# Patient Record
Sex: MALE | Race: BLACK OR AFRICAN AMERICAN | NOT HISPANIC OR LATINO | Employment: FULL TIME | URBAN - METROPOLITAN AREA
[De-identification: names, ages, dates, MRNs, and addresses within clinical notes are randomized per-mention and may not be internally consistent; named-entity substitution may affect disease eponyms.]

---

## 2022-01-26 ENCOUNTER — TELEPHONE (OUTPATIENT)
Dept: GASTROENTEROLOGY | Facility: CLINIC | Age: 52
End: 2022-01-26
Payer: OTHER GOVERNMENT

## 2022-01-26 NOTE — TELEPHONE ENCOUNTER
----- Message from Dejah Hagen sent at 1/26/2022 12:06 PM CST -----  Regarding: [facility]: [facility]  Ok I [facility] my apologies   ----- Message -----  From: Nicole Mai LPN  Sent: 1/26/2022  11:59 AM CST  To: Dejah Hagen  Subject: FW: [facility]                         Pt's insurance is not chart. We are not able to schedule without this. Please update.    Thank you  ----- Message -----  From: Dejah Hagen  Sent: 1/26/2022  11:35 AM CST  To: Stef[facility]HELPS Staff  Subject: [facility]                               I have scanned the referral and records in to media mgr. Please contact pt to schedule and let me know if I can help any further.    Thank you,  Dejah Enriquez

## 2022-02-09 ENCOUNTER — OFFICE VISIT (OUTPATIENT)
Dept: GASTROENTEROLOGY | Facility: CLINIC | Age: 52
End: 2022-02-09
Payer: OTHER GOVERNMENT

## 2022-02-09 VITALS — HEIGHT: 69 IN | WEIGHT: 192 LBS | BODY MASS INDEX: 28.44 KG/M2

## 2022-02-09 DIAGNOSIS — Z12.11 SCREENING FOR COLON CANCER: Primary | ICD-10-CM

## 2022-02-09 DIAGNOSIS — Z01.818 PRE-OP TESTING: ICD-10-CM

## 2022-02-09 PROCEDURE — 99202 OFFICE O/P NEW SF 15 MIN: CPT | Mod: S$PBB,,, | Performed by: NURSE PRACTITIONER

## 2022-02-09 PROCEDURE — 99999 PR PBB SHADOW E&M-EST. PATIENT-LVL III: ICD-10-PCS | Mod: PBBFAC,,, | Performed by: NURSE PRACTITIONER

## 2022-02-09 PROCEDURE — 99213 OFFICE O/P EST LOW 20 MIN: CPT | Mod: PBBFAC,PO | Performed by: NURSE PRACTITIONER

## 2022-02-09 PROCEDURE — 99202 PR OFFICE/OUTPT VISIT, NEW, LEVL II, 15-29 MIN: ICD-10-PCS | Mod: S$PBB,,, | Performed by: NURSE PRACTITIONER

## 2022-02-09 PROCEDURE — 99999 PR PBB SHADOW E&M-EST. PATIENT-LVL III: CPT | Mod: PBBFAC,,, | Performed by: NURSE PRACTITIONER

## 2022-02-09 RX ORDER — BACLOFEN 10 MG/1
TABLET ORAL
COMMUNITY
Start: 2022-02-02 | End: 2023-10-24

## 2022-02-09 RX ORDER — MELOXICAM 7.5 MG/1
7.5 TABLET ORAL
COMMUNITY
End: 2023-11-03

## 2022-02-09 RX ORDER — NAPROXEN 500 MG/1
500 TABLET ORAL 2 TIMES DAILY
Status: ON HOLD | COMMUNITY
End: 2023-12-01 | Stop reason: HOSPADM

## 2022-02-09 RX ORDER — FLUTICASONE PROPIONATE 50 UG/1
POWDER, METERED RESPIRATORY (INHALATION)
COMMUNITY
End: 2023-11-03

## 2022-02-09 NOTE — PROGRESS NOTES
Subjective:       Patient ID: Marshall Anderson is a 51 y.o. male, Body mass index is 28.36 kg/m².    Chief Complaint: Other      Patient is new to me. [facility] for malignant neoplasm of intestinal tract.     GI Problem  Primary symptoms do not include fever, weight loss, fatigue, abdominal pain, nausea, vomiting, diarrhea, melena, hematemesis, jaundice, hematochezia, dysuria or rash.   The illness does not include chills, anorexia, dysphagia, odynophagia, bloating or constipation. Associated symptoms comments: [facility] for malignant neoplasm of intestinal tract. No prior hx of c-scope. Denies family history of colon cancer. Feeling well, no complaints. Associated medical issues do not include inflammatory bowel disease, GERD, gallstones, liver disease, alcohol abuse, PUD, gastric bypass, bowel resection, irritable bowel syndrome, hemorrhoids or diverticulitis.     Review of Systems   Constitutional: Negative for appetite change, chills, fatigue, fever, unexpected weight change and weight loss.   HENT: Negative for trouble swallowing.    Respiratory: Negative for cough and shortness of breath.    Cardiovascular: Negative for chest pain.   Gastrointestinal: Negative for abdominal distention, abdominal pain, anal bleeding, anorexia, bloating, blood in stool, constipation, diarrhea, dysphagia, hematemesis, hematochezia, jaundice, melena, nausea, rectal pain and vomiting.   Genitourinary: Negative for difficulty urinating and dysuria.   Musculoskeletal: Negative for gait problem.   Skin: Negative for rash.   Neurological: Negative for speech difficulty.   Psychiatric/Behavioral: Negative for confusion.       No past medical history on file.   No past surgical history on file.   Family History   Problem Relation Age of Onset    Colon cancer Neg Hx       Wt Readings from Last 10 Encounters:   02/09/22 87.1 kg (192 lb 0.3 oz)              Reviewed  prior medical records including endoscopy history (see surgical history).     Objective:      Physical Exam  Constitutional:       General: He is not in acute distress.Vital signs are normal.      Appearance: He is well-developed and well-nourished. He is not sickly-appearing.   HENT:      Head: Normocephalic.      Right Ear: Hearing normal.      Left Ear: Hearing normal.      Nose: Nose normal.      Mouth/Throat:      Mouth: Oropharynx is clear and moist and mucous membranes are normal.      Comments: Pt wearing mask due to COVID concerns  Eyes:      General: Lids are normal.      Conjunctiva/sclera: Conjunctivae normal.      Pupils: Pupils are equal, round, and reactive to light.   Neck:      Trachea: Trachea normal.   Cardiovascular:      Rate and Rhythm: Normal rate and regular rhythm.      Heart sounds: Normal heart sounds. No murmur heard.      Pulmonary:      Effort: Pulmonary effort is normal. No respiratory distress.      Breath sounds: Normal breath sounds. No stridor. No wheezing.   Abdominal:      General: Bowel sounds are normal. There is no distension or ascites.      Palpations: Abdomen is soft. There is no mass.      Tenderness: There is no abdominal tenderness. There is no guarding or rebound.   Musculoskeletal:         General: Normal range of motion.      Cervical back: Normal range of motion.   Skin:     General: Skin is warm, dry and intact.      Findings: No rash.      Comments: Non jaundiced   Neurological:      Mental Status: He is alert and oriented to person, place, and time.   Psychiatric:         Mood and Affect: Mood and affect normal.         Speech: Speech normal.         Behavior: Behavior normal. Behavior is cooperative.           Assessment:       1. Screening for colon cancer           Plan:   All diagnoses and orders for this visit:    Screening for colon cancer   - Schedule Colonoscopy    If no improvement in symptoms or symptoms worsen, call/follow-up at clinic or go to ER

## 2022-02-09 NOTE — PATIENT INSTRUCTIONS
"Patient Education       Colon and Rectal Cancer Screening   The Basics   Written by the doctors and editors at [facility]   What is colon and rectal cancer screening? -- Colon and rectal cancer screening is a way in which doctors check the colon and rectum for signs of cancer or growths (called polyps) that might become cancer. It is done in people who have no symptoms and no reason to think they have cancer. The goal is to find and remove polyps before they become cancer, or to find cancer early, before it grows, spreads, or causes problems.  The colon and rectum are the last part of the digestive tract (figure 1). When doctors talk about colon and rectal cancer screening, they use the term "colorectal." That is just a shorter way of saying "colon and rectal." It's also possible to say just colon cancer screening.  Studies show that having colon cancer screening lowers the chance of dying from colon cancer. There are several different types of screening test that can do this.  What are the different screening tests for colon cancer? -- They include:  · Colonoscopy - Colonoscopy allows the doctor to see directly inside the entire colon. Before you can have a colonoscopy, you must clean out your colon. You do this at home by drinking a special liquid that causes watery diarrhea for several hours. On the day of the test, you get medicine to help you relax, if you want. Then a doctor puts a thin tube into your anus and advances it into your colon (figure 2). The tube has a tiny camera attached to it, so the doctor can see inside your colon. The tube also has tiny tools on the end, so the doctor can remove pieces of tissue or polyps if they are there. After polyps or pieces of tissue are removed, they are sent to a lab to be checked for cancer.  ? Advantages of this test - Colonoscopy finds most small polyps and almost all large polyps and cancers. If found, polyps can be removed right away. This test gives the most " "accurate results. If any other screening tests are done first and come back positive (abnormal), a colonoscopy will need to be done for follow-up. If you have a colonoscopy as your first test, you will probably not need a second follow-up test soon after.  ? Drawbacks to this test - Colonoscopy has some risks. It can cause bleeding or tear the inside of the colon, but this only happens in 1 out of 1,000 people. Also, cleaning out the bowel beforehand can be unpleasant. Plus, people usually cannot work or drive for the rest of the day after the test, because of the relaxation medicine they take during the test.  In certain situations, a doctor might do something called a "capsule" colonoscopy. For this test, you swallow a special capsule that contains tiny wireless video cameras. This might be done if your doctor was not able to see all of your colon during a regular colonoscopy.   · CT colonography (also known as virtual colonoscopy or CTC) - CTC looks for cancer and polyps using a special X-ray called a "CT scan." For most CTC tests, the preparation is the same as it is for colonoscopy.  ? Advantages of this test - CTC can find polyps and cancers in the whole colon without the need for medicines to relax.  ? Drawbacks to this test - If doctors find polyps or cancer with CTC, they usually follow up with a colonoscopy. CTC sometimes finds areas that look abnormal but that turn out to be healthy. This means that CTC can lead to tests and procedures you did not need. Plus, CTC exposes you to radiation. In most cases, the preparation needed to clean the bowel is the same as the one needed for a colonoscopy. The test is expensive, and some insurance companies might not cover this test for screening.  · Stool test for blood - "Stool" is another word for bowel movements. Stool tests most commonly check for blood in samples of stool. Cancers and polyps can bleed, and if they bleed around the time you do the stool test, then " blood will show up on the test. The test can find even small amounts of blood that you can't see in your stool. Other less serious conditions can also cause small amounts of blood in the stool, and that will show up in this test. You will have to collect small samples from your bowel movements, which you will put in a special container you get from your doctor or nurse. Then you follow the instructions to mail the container out for the testing.  ? Advantages of this test - This test does not involve cleaning out the colon or having any procedures.  ? Drawbacks to this test - Stool tests are less likely to find polyps than other screening tests. These tests also often come up abnormal even in people who do not have cancer. If a stool test shows something abnormal, doctors usually follow up with a colonoscopy.  · Sigmoidoscopy - A sigmoidoscopy is similar in some ways to a colonoscopy. The difference is that this test looks only at the last part of the colon, and a colonoscopy looks at the whole colon. Before you have a sigmoidoscopy, you must clean out the lower part of your colon using an enema. This bowel cleaning is not as thorough or unpleasant as the one for colonoscopy. For this test, you do not need to take medicines to help you relax, so you can drive and work afterward if you want.  ? Advantages of this test - Sigmoidoscopy can find polyps and cancers in the rectum and the last part of the colon. If polyps are found, they can be removed right away.  ? Drawbacks to this test - In about 2 out of 10,000 people, sigmoidoscopy tears the inside of the colon. The test also can't find polyps or cancers that are in the part of the colon the test does not view (figure 3). If doctors find polyps or cancer during a sigmoidoscopy, they usually follow up with a colonoscopy.  · Stool DNA test - The stool DNA test checks for genetic markers of cancer, as well as for signs of blood. For this test, you get a special kit in  "order to collect a whole bowel movement. Then you follow the instructions about how and where to ship it.  ? Advantages of this test - This test does not involve cleaning out the colon or having any procedures. When cancer is not present, it is less likely to be falsely abnormal than a stool test for blood. That means it leads to fewer unnecessary colonoscopies.  ? Drawbacks to this test - It might be unpleasant to collect and ship a whole bowel movement. If a DNA test shows something abnormal, doctors usually follow up with a colonoscopy.  There is no blood test that most experts think is accurate enough to use for screening.  How do I choose which test to have? -- Work with your doctor or nurse to decide which test is best for you. Some doctors might choose to combine screening tests, for example, sigmoidoscopy plus stool testing for blood. Being screened-no matter how-is more important than which test you choose.  Who should be screened for colon cancer? -- Doctors recommend that most people begin having colon cancer screening at age 45. People who have an increased risk of getting colon cancer sometimes begin screening at a younger age. That might include people with a strong family history of colon cancer, and people with diseases of the colon called "Crohn's disease" and "ulcerative colitis."  Most people can stop being screened around the age of 75, or at the latest 85.  How often should I be screened? -- That depends on your risk of colon cancer and which test you have. People who have a high risk of colon cancer often need to be tested more often and should have a colonoscopy.  Most people are not at high risk, so they can choose one of these schedules:  · Colonoscopy every 10 years  · CT colonography (CTC) every 5 years  · Stool testing for blood once a year  · Sigmoidoscopy every 5 to 10 years  · Stool DNA testing every 3 years (but doctors are not yet sure of the best time frame for repeating the " "test)  All topics are updated as new evidence becomes available and our peer review process is complete.  This topic retrieved from [employer] on: Sep 21, 2021.  Topic 22445 Version 16.0  Release: 29.4.2 - C29.263  © 2021 UpToDate, Inc. and/or its affiliates. All rights reserved.  figure 1: Digestive system     This drawing shows the organs in the body that process food. Together these organs are called "the digestive system," or "digestive tract." As food travels through this system, the body absorbs nutrients and water.  Graphic 74592 Version 4.0    figure 2: Colonoscopy     During a colonoscopy, you lie on your side and the doctor puts a thin tube with a camera into your anus (from behind). Then the doctor advances the tube into the rectum and colon. The camera sends pictures from inside your colon to a television screen.  Graphic 11052 Version 6.0    figure 3: Colonoscopy versus sigmoidoscopy     During a colonoscopy or a sigmoidoscopy, you lie on your side, and the doctor or nurse puts a thin tube with a camera into your anus (from behind). Then the doctor or nurse advances the tube into the rectum and colon. The camera sends pictures from inside your colon to a television screen.  A colonoscopy allows the doctor to see the whole colon (shown in pink and green). A sigmoidoscopy allows the doctor to see only the last part of the colon (shown in green).  Graphic 18787 Version 5.0    Consumer Information Use and Disclaimer   This information is not specific medical advice and does not replace information you receive from your health care provider. This is only a brief summary of general information. It does NOT include all information about conditions, illnesses, injuries, tests, procedures, treatments, therapies, discharge instructions or life-style choices that may apply to you. You must talk with your health care provider for complete information about your health and treatment options. This information should " not be used to decide whether or not to accept your health care provider's advice, instructions or recommendations. Only your health care provider has the knowledge and training to provide advice that is right for you. The use of this information is governed by the [employer] End User License Agreement, available at https://www.[employer]/en/solutions/"[employer]"/about/davon.The use of [employer] content is governed by the [employer] Terms of Use. ©2021 [employer] All rights reserved.  Copyright   © 2021 UpToDate, Inc. and/or its affiliates. All rights reserved.

## 2023-10-12 ENCOUNTER — TELEPHONE (OUTPATIENT)
Dept: VASCULAR SURGERY | Facility: CLINIC | Age: 53
End: 2023-10-12
Payer: OTHER GOVERNMENT

## 2023-10-12 NOTE — TELEPHONE ENCOUNTER
----- Message from Melyssa Jaimes sent at 10/12/2023  3:16 PM CDT -----  Contact: pt  Type: Needs Medical Advice  Who Called:  pt  Best Call Back Number: [phone]    Additional Information: Pt is calling the office regarding referral that was sent in.please call back and advise.

## 2023-10-12 NOTE — TELEPHONE ENCOUNTER
----- Message from Sandy Roberson sent at 10/12/2023  3:17 PM CDT -----  Type:  Patient Returning Call    Who Called:  pt  Who Left Message for Patient:  unknown  Does the patient know what this is regarding?:  yes  Best Call Back Number:  [phone] (home)     Additional Information:  please call and advise--thank you

## 2023-10-12 NOTE — TELEPHONE ENCOUNTER
----- Message from Melyssa Jaimes sent at 10/12/2023  3:16 PM CDT -----  Contact: pt  Type: Needs Medical Advice  Who Called:  pt  Best Call Back Number: [phone]    Additional Information: Pt is calling the office regarding referral that was sent in.please call back and advise.

## 2023-10-24 ENCOUNTER — OFFICE VISIT (OUTPATIENT)
Dept: VASCULAR SURGERY | Facility: CLINIC | Age: 53
End: 2023-10-24
Payer: OTHER GOVERNMENT

## 2023-10-24 VITALS
HEIGHT: 69 IN | WEIGHT: 189.81 LBS | HEART RATE: 62 BPM | BODY MASS INDEX: 28.11 KG/M2 | SYSTOLIC BLOOD PRESSURE: 131 MMHG | DIASTOLIC BLOOD PRESSURE: 81 MMHG

## 2023-10-24 DIAGNOSIS — I25.10 CORONARY ARTERY DISEASE, UNSPECIFIED VESSEL OR LESION TYPE, UNSPECIFIED WHETHER ANGINA PRESENT, UNSPECIFIED WHETHER NATIVE OR TRANSPLANTED HEART: Primary | ICD-10-CM

## 2023-10-24 DIAGNOSIS — Z01.818 ENCOUNTER FOR OTHER PREPROCEDURAL EXAMINATION: ICD-10-CM

## 2023-10-24 DIAGNOSIS — I25.118 CORONARY ARTERY DISEASE INVOLVING NATIVE CORONARY ARTERY OF NATIVE HEART WITH OTHER FORM OF ANGINA PECTORIS: Primary | ICD-10-CM

## 2023-10-24 PROCEDURE — 99214 OFFICE O/P EST MOD 30 MIN: CPT | Mod: PBBFAC,PN | Performed by: THORACIC SURGERY (CARDIOTHORACIC VASCULAR SURGERY)

## 2023-10-24 PROCEDURE — 99999 PR PBB SHADOW E&M-EST. PATIENT-LVL IV: ICD-10-PCS | Mod: PBBFAC,,, | Performed by: THORACIC SURGERY (CARDIOTHORACIC VASCULAR SURGERY)

## 2023-10-24 PROCEDURE — 99205 PR OFFICE/OUTPT VISIT, NEW, LEVL V, 60-74 MIN: ICD-10-PCS | Mod: S$PBB,,, | Performed by: THORACIC SURGERY (CARDIOTHORACIC VASCULAR SURGERY)

## 2023-10-24 PROCEDURE — 99999 PR PBB SHADOW E&M-EST. PATIENT-LVL IV: CPT | Mod: PBBFAC,,, | Performed by: THORACIC SURGERY (CARDIOTHORACIC VASCULAR SURGERY)

## 2023-10-24 PROCEDURE — 99205 OFFICE O/P NEW HI 60 MIN: CPT | Mod: S$PBB,,, | Performed by: THORACIC SURGERY (CARDIOTHORACIC VASCULAR SURGERY)

## 2023-10-24 RX ORDER — LANOLIN ALCOHOL/MO/W.PET/CERES
2000 CREAM (GRAM) TOPICAL
COMMUNITY
Start: 2023-02-01

## 2023-10-24 RX ORDER — AZELASTINE 1 MG/ML
SPRAY, METERED NASAL
COMMUNITY
Start: 2023-10-18

## 2023-10-24 RX ORDER — ALLOPURINOL 100 MG/1
200 TABLET ORAL NIGHTLY
COMMUNITY
Start: 2023-08-21

## 2023-10-24 RX ORDER — ASPIRIN 81 MG/1
81 TABLET ORAL DAILY
COMMUNITY
Start: 2023-04-13

## 2023-10-24 RX ORDER — CETIRIZINE HYDROCHLORIDE 10 MG/1
10 TABLET ORAL DAILY PRN
COMMUNITY
Start: 2023-10-18

## 2023-10-24 RX ORDER — AMOXICILLIN AND CLAVULANATE POTASSIUM 875; 125 MG/1; MG/1
1 TABLET, FILM COATED ORAL 2 TIMES DAILY
COMMUNITY
Start: 2023-10-18 | End: 2023-11-21 | Stop reason: CLARIF

## 2023-10-24 RX ORDER — CHOLECALCIFEROL (VITAMIN D3) 50 MCG
1 TABLET ORAL DAILY
COMMUNITY
Start: 2023-02-01

## 2023-10-24 RX ORDER — PANTOPRAZOLE SODIUM 40 MG/1
40 TABLET, DELAYED RELEASE ORAL NIGHTLY
COMMUNITY
Start: 2023-05-20

## 2023-10-24 RX ORDER — LIDOCAINE 50 MG/G
PATCH TOPICAL
COMMUNITY
Start: 2023-02-01

## 2023-10-24 RX ORDER — ATORVASTATIN CALCIUM 80 MG/1
80 TABLET, FILM COATED ORAL NIGHTLY
COMMUNITY
Start: 2023-04-13

## 2023-10-24 NOTE — PROGRESS NOTES
"THIS PATIENT'S OPERATIVE DATE HAS BEEN CHANGED SEVERAL TIMES IN LIGHT OF THE THANKSGIVING HOLIDAY.  HE PRESENTS TODAY FOR CABG, EVH.    THERE HAVE BEEN NO CHANGES IN THE DOCUMENTED HISTORY AND PE.    HE HAS BEEN "CLEARED" BY PULMONARY FOR THE, LIKELY, PULMONARY SARCOIDOSIS.      DATE OF CONSULTATION: 10/24/2023     CONSULT REQUESTED BY:  Dr. Alayna Hutchinson    REASON FOR CONSULTATION:  Exertional dyspnea, triple-vessel coronary artery disease     HPI:   The patient is a 53-year-old  male with a family history of coronary artery disease.  His father  from coronary artery disease at the age of 68.    When questioned specifically today, the patient has difficulty explaining symptoms leading up to a stress test, Holter, ECHO, and left heart catheterization.    As best as I can tell, the patient presented to his primary care provider at the [facility] with a several month history of exertional dyspnea.  He also has a chronic left upper extremity tremor that he wanted evaluated.    The patient had a brain scan demonstrating a chronic stroke.  This led to a carotid ultrasound (nonobstructive disease) where swollen lymph nodes were identified.  This led to a CT scan of the chest which demonstrates multiple spots.  The spots were biopsied and the patient was initiated on antibiotic therapy.    I assume, because of his complaints of exertional dyspnea, he was seen by Dr. Hutchinson who ordered a Holter monitor (no significant arrhythmias for 6 days), an echocardiogram 2023 (ejection fraction 55%, left ventricular hypertrophy, moderate mitral regurgitation, mild aortic regurgitation/tricuspid regurgitation) and a stress test 2023 which is suggestive of reversible ischemia.    Left heart catheterization with coronary angiography demonstrates a 90% ostial right coronary artery lesion, occlusion of the distal circumflex with collateralization of a branching OM, and a long moderately severe " proximal LAD stenosis.    Cardiac surgical consultation has been requested for CABG.    The patient lives a busy and very active lifestyle and is in denial about his symptoms.  His wife, however, endorses slowly progressive exertional dyspnea and generalized fatigue over the last 6 months or so.  He, specifically, denies any anginal-type symptoms or symptoms of congestive heart failure other than the exertional dyspnea. He denies ever having had a heart attack.       Data Review:  In preparation for this consultation, I have reviewed the patient's entire medical record from Dr. Hutchinson's office.  I personally reviewed images from the coronary angiogram today.      No past medical history on file.     Hypertension, hyperlipidemia, gout, right foot injury/infection-resolved     Past Surgical History:   Procedure Laterality Date    COLONOSCOPY N/A 4/4/2022    Procedure: COLONOSCOPY;  Surgeon: Mike Ko MD;  Location: [facility];  Service: Endoscopy;  Laterality: N/A;    KNEE ARTHROSCOPY Right     ROTATOR CUFF REPAIR Left         Right knee arthroscopy, left rotator cuff repair, wisdom teeth extraction     Social History     Socioeconomic History    Marital status:    Tobacco Use    Smoking status: Former     Types: Cigarettes    Smokeless tobacco: Never   Substance and Sexual Activity    Alcohol use: Not Currently     Comment: quit years ago    Drug use: Never    Sexual activity: Yes        The patient has a 10-15 pack-year history of tobacco abuse.  He quit smoking cigarettes in 1992.  The patient has not consumed an alcoholic beverage since 2006.  He is  and has 5 adult children who are all in good health.  The patient works multiple jobs and lives a busy and active lifestyle but does not exercise regularly.  He is a , he mows yards, and works as a Constable.  He receives regular, twice yearly, dental care.       Allergies:  Morphine-itching      Prior to Admission Meds (Last known  outpatient meds at time of note signature)   Prior to Admission medications    Medication Sig Start Date End Date Taking? Authorizing Provider   aspirin (ECOTRIN) 81 MG EC tablet 81 mg. 4/13/23  Yes Provider, Historical   atorvastatin (LIPITOR) 80 MG tablet 40 mg. 4/13/23  Yes Provider, Historical   cetirizine (ZYRTEC) 10 MG tablet 10 mg. 10/18/23  Yes Provider, Historical   cholecalciferol, vitamin D3, (VITAMIN D3) 50 mcg (2,000 unit) Tab Take 1 tablet by mouth once daily. 2/1/23  Yes Provider, Historical   cyanocobalamin (VITAMIN B-12) 1000 MCG tablet 2,000 mcg. 2/1/23  Yes Provider, Historical   LIDOcaine (LIDODERM) 5 % APPLY 2 PATCHES TOPICALLY EVERY DAY WEAR FOR 12 HOURS, THEN REMOVE. DO NOT APPLY NEW PATCH FOR AT LEAST 12 HOURS 2/1/23  Yes Provider, Historical   allopurinoL (ZYLOPRIM) 100 MG tablet Take 200 mg by mouth. 8/21/23   Provider, Historical   amoxicillin-clavulanate 875-125mg (AUGMENTIN) 875-125 mg per tablet Take 1 tablet by mouth 2 (two) times daily. 10/18/23   Provider, Historical   azelastine (ASTELIN) 137 mcg (0.1 %) nasal spray SMARTSIG:Both Nares 10/18/23   Provider, Historical   fluticasone propionate (FLOVENT DISKUS) 50 mcg/actuation DsDv Inhale into the lungs. Controller    Provider, Historical   meloxicam (MOBIC) 7.5 MG tablet Take 7.5 mg by mouth.    Provider, Historical   naproxen (NAPROSYN) 500 MG tablet Take 500 mg by mouth 2 (two) times daily.    Provider, Historical   pantoprazole (PROTONIX) 40 MG tablet Take 40 mg by mouth. 5/20/23   Provider, Historical   baclofen (LIORESAL) 10 MG tablet  2/2/22 10/24/23  Provider, Historical   pantoprazole (PROTONIX) 20 MG tablet Take 2 tablets (40 mg total) by mouth once daily. 4/6/22 10/24/23  Mary Simons PA-C   sucralfate (CARAFATE) 100 mg/mL suspension Take 10 mLs (1 g total) by mouth every 6 (six) hours. 4/6/22 10/24/23  Mary Simons PA-C        Review of Systems:   Constitutional: no fever, no chills, no appetite  change, no unexpected weight change   Dermatological: no jaundice, no rash, no nodules, no ulcers, no pruritis   HEENT: no vision change, no hearing change, no nasal discharge, no sore throat   Neck: no unusual stiffness, no swollen glands, no goiter   Respiratory: (+) dyspnea, no cough, no hemoptysis, no wheezing,  Cardiovascular: no chest pain, no palpitations, no edema   Gastrointestinal: no abdominal discomfort   Musculoskeletal: no new joint pain, no joint swelling, no myalgia   : no dysuria, no frequency, no gross hematuria   HEME: no prolonged bleeding, no excessive bruising, no blood clots, no adenopathy   Endocrine: no excessive thirst, no unusual intolerance of heat or cold   Neurological: no confusion, no seizures, no syncope, no falls   Psychological: no anxiety, no depression     Objective:   Vitals:    10/24/23 1243   BP: 131/81   Pulse: 62       Physical Exam:   Constitutional: Alert, appears stated age, cooperative and no distress.  Normal body habitus, no obvious deformities, good attention to grooming.   Head: Normocephalic, without obvious abnormality, atraumatic   Eyes: Conjunctivae/corneas clear. PERRL, EOM's intact. No exudate.  Nose: Nares normal. Septum midline. Mucosa normal. No drainage or sinus tenderness.   Throat: Lips, mucosa, and tongue normal; teeth and gums normal   Neck: No adenopathy, no carotid bruit, no JVD, supple, symmetrical, trachea midline, and thyroid not enlarged, symmetric, no tenderness/mass/nodules.   Back: Symmetric, no curvature ROM normal No CVA tenderness.   Lungs: Clear to auscultation bilaterally and good air exchange. No tachypnea. No use of accessory muscles.   Heart: Regular rate and rhythm, S1, S2 normal, no murmur, click, rub or gallop. PMI nondisplaced.   Abdomen: Soft, non-tender, bowel sounds normal; No hepatosplenomegaly. No hernias   Aorta: no evidence of aneurysm   Musculoskeletal: No evidence of kyphosis or scoliosis. Normal gait. Normal muscle  strength and tone. No evidence of limb atrophy or abnormal movements.   Extremities: Normal, atraumatic, no clubbing, cyanosis, or edema. Hair present on pretibial regions. There are no venous varicosities.   Pulses: 2+ and symmetric in both radial and femoral regions.  Skin: Skin color, texture, turgor normal.  No rashes or lesions.  Lymph nodes: Cervical, supraclavicular, and axillary nodes normal   Neurologic/psychiatric: Cranial nerves 2-12 are grossly intact No obvious abnormality. Oriented to person, place, and time. No depression, anxiety or agitation.     Plan:  The patient has significant triple-vessel coronary artery disease.  He admits to slowly progressive exertional dyspnea.     The best treatment option for him will be surgical coronary revascularization.    I had a long (> 1 hour) discussion with the patient and his wife in the office this afternoon.  I reviewed the surgical indications and the sub optimal medical therapeutic alternative treatments with them.  Many questions were asked and answered.      I also discussed, in detail, the potential surgical risks and the expected benefits and outcome of coronary artery bypass grafting, endoscopic saphenous vein and left radial artery harvest.      I calculated and discussed the 30-day STS risk for morbidity or mortality with them.     Procedure Type: Isolated CABG  Perioperative Outcome Estimate %  Operative Mortality 0.67%  Morbidity & Mortality 7.13%  Stroke 1.69%  Renal Failure 0.664%  Reoperation 2.42%  Prolonged Ventilation 3.16%  Deep Sternal Wound Infection 0.119%  Long Hospital Stay (>14 days) 2.98%  Short Hospital Stay (<6 days)* 50.2%    The patient and his wife seem to understand and wish to proceed as advised.  The patient has chosen Monday, 11/06/2023, as his operative date.  He will undergo routine preoperative evaluation prior to this.    I have reviewed the patient's current medical regimen.  He is prescribed aspirin 81 mg, Lipitor 80  mg, and Toprol-XL 25 mg.    Thank you, Dr. Hutchinson, for allowing me to participate in the care of this patient.

## 2023-11-03 ENCOUNTER — TELEPHONE (OUTPATIENT)
Dept: VASCULAR SURGERY | Facility: CLINIC | Age: 53
End: 2023-11-03
Payer: OTHER GOVERNMENT

## 2023-11-03 PROBLEM — Z86.73 HISTORY OF STROKE: Status: ACTIVE | Noted: 2023-11-03

## 2023-11-03 PROBLEM — I25.10 CAD, MULTIPLE VESSEL: Status: ACTIVE | Noted: 2023-11-03

## 2023-11-03 PROBLEM — I44.0 FIRST DEGREE AV BLOCK: Status: ACTIVE | Noted: 2023-11-03

## 2023-11-03 PROBLEM — R06.09 DOE (DYSPNEA ON EXERTION): Status: ACTIVE | Noted: 2023-11-03

## 2023-11-03 PROBLEM — I51.89 GRADE I DIASTOLIC DYSFUNCTION: Status: ACTIVE | Noted: 2023-11-03

## 2023-11-03 PROBLEM — I45.10 INCOMPLETE RIGHT BUNDLE BRANCH BLOCK: Status: ACTIVE | Noted: 2023-11-03

## 2023-11-03 PROBLEM — R00.1 SINUS BRADYCARDIA: Status: ACTIVE | Noted: 2023-11-03

## 2023-11-03 PROBLEM — I10 HYPERTENSION: Status: ACTIVE | Noted: 2023-11-03

## 2023-11-03 NOTE — TELEPHONE ENCOUNTER
Verbal order given for EKG to Krystal   ----- Message from Orlando Swartz sent at 11/3/2023 10:50 AM CDT -----  Type: Needs Medical Advice  Who Called:   Krystal/ JORGE- Pre Admit      Best Call Back Number: [phone]  Additional Information: Caller states that she would like a Verbal for the patient's EKG  Please call ASAP-- Patient is waiting

## 2023-11-06 DIAGNOSIS — R91.8 PULMONARY NODULES: Primary | ICD-10-CM

## 2023-11-07 PROBLEM — R59.9 ENLARGED LYMPH NODES: Status: ACTIVE | Noted: 2023-11-07

## 2023-11-07 PROBLEM — D86.9 SARCOIDOSIS: Status: ACTIVE | Noted: 2023-11-07

## 2023-11-28 PROBLEM — Z95.1 HX OF CABG: Status: ACTIVE | Noted: 2023-11-28

## 2023-12-01 ENCOUNTER — TELEPHONE (OUTPATIENT)
Dept: VASCULAR SURGERY | Facility: CLINIC | Age: 53
End: 2023-12-01
Payer: OTHER GOVERNMENT

## 2023-12-01 NOTE — TELEPHONE ENCOUNTER
Appt scheduled for p/o appt and CXR prior.     ----- Message from Patricia Lobato sent at 12/1/2023  8:39 AM CST -----  Regarding: Hosp f/u  Type:  Sooner Apoointment Request    Caller is requesting a sooner appointment.  Caller declined first available appointment listed below.  Caller will not accept being placed on the waitlist and is requesting a message be sent to doctor.    Name of Caller:Nurse Paris PATEL    When is the first available appointment?n/a    Symptoms:Hosp f/u    Would the patient rather a call back or a response via [facility]? Call back    Best Call Back Number:[phone]    Additional Information: Pt will be discharge today and needs a f/u appt. Nurse would also like a call back [phone] about [facility]hab. Please advise -----thank you

## 2023-12-21 ENCOUNTER — HOSPITAL ENCOUNTER (OUTPATIENT)
Dept: RADIOLOGY | Facility: HOSPITAL | Age: 53
Discharge: HOME | End: 2023-12-21
Attending: THORACIC SURGERY (CARDIOTHORACIC VASCULAR SURGERY)
Payer: OTHER GOVERNMENT

## 2023-12-21 ENCOUNTER — OFFICE VISIT (OUTPATIENT)
Dept: VASCULAR SURGERY | Facility: CLINIC | Age: 53
End: 2023-12-21
Payer: OTHER GOVERNMENT

## 2023-12-21 VITALS
SYSTOLIC BLOOD PRESSURE: 110 MMHG | BODY MASS INDEX: 27.47 KG/M2 | WEIGHT: 185.5 LBS | DIASTOLIC BLOOD PRESSURE: 74 MMHG | HEART RATE: 70 BPM | HEIGHT: 69 IN

## 2023-12-21 DIAGNOSIS — I25.118 CORONARY ARTERY DISEASE INVOLVING NATIVE CORONARY ARTERY OF NATIVE HEART WITH OTHER FORM OF ANGINA PECTORIS: ICD-10-CM

## 2023-12-21 DIAGNOSIS — Z95.1 HX OF CABG: Primary | ICD-10-CM

## 2023-12-21 DIAGNOSIS — D86.9 SARCOIDOSIS: ICD-10-CM

## 2023-12-21 PROCEDURE — 99999 PR PBB SHADOW E&M-EST. PATIENT-LVL III: CPT | Mod: PBBFAC,,, | Performed by: THORACIC SURGERY (CARDIOTHORACIC VASCULAR SURGERY)

## 2023-12-21 PROCEDURE — 71046 XR CHEST PA AND LATERAL: ICD-10-PCS | Mod: 26,,, | Performed by: RADIOLOGY

## 2023-12-21 PROCEDURE — 99999 PR PBB SHADOW E&M-EST. PATIENT-LVL III: ICD-10-PCS | Mod: PBBFAC,,, | Performed by: THORACIC SURGERY (CARDIOTHORACIC VASCULAR SURGERY)

## 2023-12-21 PROCEDURE — 71046 X-RAY EXAM CHEST 2 VIEWS: CPT | Mod: 26,,, | Performed by: RADIOLOGY

## 2023-12-21 PROCEDURE — 99024 PR POST-OP FOLLOW-UP VISIT: ICD-10-PCS | Mod: ,,, | Performed by: THORACIC SURGERY (CARDIOTHORACIC VASCULAR SURGERY)

## 2023-12-21 PROCEDURE — 71046 X-RAY EXAM CHEST 2 VIEWS: CPT | Mod: TC,FY,PO

## 2023-12-21 PROCEDURE — 99024 POSTOP FOLLOW-UP VISIT: CPT | Mod: ,,, | Performed by: THORACIC SURGERY (CARDIOTHORACIC VASCULAR SURGERY)

## 2023-12-21 PROCEDURE — 99213 OFFICE O/P EST LOW 20 MIN: CPT | Mod: PBBFAC,25,PN | Performed by: THORACIC SURGERY (CARDIOTHORACIC VASCULAR SURGERY)

## 2023-12-21 NOTE — PROGRESS NOTES
12/21/2023...    The patient underwent CABG x3 with a LIMA 11/28/2023.  His postoperative course was smooth and progressive.  He remained in normal sinus rhythm for the duration.  He was discharged home on the 3rd postoperative day.    The patient visits the office today accompanied by his wife.  He looks terrific!  He has no cardiopulmonary complaints.  He notices improvement in his exertional dyspnea.  He has newly diagnosed pulmonary sarcoidosis with plans for treatment initiation in February 2024.    On physical exam, breath sounds are clear and equal bilaterally.  His sternotomy wound has healed nicely.  The bone is solid.  The chest tube sites are healed.  He has no lower extremity edema and the EVH site is healed.    CXR today is clear-there is no pleural effusion.  The radiologist has noted pulmonary nodules and hilar adenopathy consistent with the diagnosis of sarcoidosis.    I have reviewed the patient's medications with him today.  He has been compliant.    I will see him for a final checkup in 6-8 weeks   Today, I have encouraged him to liberalize his activity level

## 2024-02-06 ENCOUNTER — TELEPHONE (OUTPATIENT)
Dept: VASCULAR SURGERY | Facility: CLINIC | Age: 54
End: 2024-02-06
Payer: OTHER GOVERNMENT

## 2024-02-07 NOTE — PROGRESS NOTES
CARDIOTHORACIC SURGERY    Subjective:     CHIEF COMPLAINT  Follow up for      HPI  Marshall Anderson is a 53 y.o. male who underwent CABG x3 on 11/28/23 with Katie Agosto MD.    Postoperatively, patient had a smooth and progressive recovery course. Discharged on POD #3 with ASA, atorvastatin, and metoprolol tartrate.     At his initial follow up appointment, patient was doing well postoperatively. He noted improvement in his exertional dyspnea at that time. CXR demonstrated pulmonary nodules and hilar adenopathy consistent with his sarcoidosis diagnosis for which he is scheduled to receive treatment this month.     Patient visits office today with his wife. Has no cardiopulmonary complaints. States previous symptoms of dyspnea and fatigue have resolved. He reports some mild tingling along the left breastbone which is not unexpected post CABG. Patient is walking approximately 3 miles per day and notes normalizing appetite and GI function. He was unable to participate in cardiac rehab secondary to insurance issues. States he is compliant with his medications and does not need a refill. He reports [facility]24 for further evaluation of his sarcoidosis.      The following portions of the patient's history were reviewed and updated as appropriate: allergies, current medications, past family history, past medical history, past social history, past surgical history and problem list.    Patient Active Problem List   Diagnosis    Incomplete right bundle branch block    First degree AV block    Sinus bradycardia    CARRASCO (dyspnea on exertion)    Grade I diastolic dysfunction    History of stroke    Coronary artery disease involving native coronary artery of native heart without angina pectoris    Hypertension    Sarcoidosis    Enlarged lymph nodes    Hx of CABG     Past Medical History:   Diagnosis Date    Anticoagulant long-term use     Coronary artery disease     GERD (gastroesophageal  reflux disease)     Stroke     possible, pt unsure    TIA (transient ischemic attack)      Outpatient Encounter Medications as of 2/8/2024   Medication Sig Dispense Refill    allopurinoL (ZYLOPRIM) 100 MG tablet Take 200 mg by mouth every evening.      aspirin (ECOTRIN) 81 MG EC tablet Take 81 mg by mouth once daily.      atorvastatin (LIPITOR) 80 MG tablet Take 80 mg by mouth every evening.      azelastine (ASTELIN) 137 mcg (0.1 %) nasal spray SMARTSIG:Both Nares      cetirizine (ZYRTEC) 10 MG tablet Take 10 mg by mouth daily as needed.      cholecalciferol, vitamin D3, (VITAMIN D3) 50 mcg (2,000 unit) Tab Take 1 tablet by mouth once daily.      cyanocobalamin (VITAMIN B-12) 1000 MCG tablet 2,000 mcg.      fluticasone propionate (FLONASE) 50 mcg/actuation nasal spray 1 spray by Each Nostril route once daily.      LIDOcaine (LIDODERM) 5 % APPLY 2 PATCHES TOPICALLY EVERY DAY WEAR FOR 12 HOURS, THEN REMOVE. DO NOT APPLY NEW PATCH FOR AT LEAST 12 HOURS      metoprolol tartrate (LOPRESSOR) 25 MG tablet Take 1 tablet (25 mg total) by mouth 2 (two) times daily. 60 tablet 11    pantoprazole (PROTONIX) 40 MG tablet Take 40 mg by mouth every evening.       Facility-Administered Encounter Medications as of 2/8/2024   Medication Dose Route Frequency Provider Last Rate Last Admin    lactated ringers infusion   Intravenous Continuous Mike Ko MD   Stopped at 04/04/22 1016    sodium chloride 0.9% flush 10 mL  10 mL Intravenous PRN Mike Ko MD          Review of patient's allergies indicates:   Allergen Reactions    Banana Anaphylaxis    Okra Swelling    Morphine Itching     Past Surgical History:   Procedure Laterality Date    COLONOSCOPY N/A 04/04/2022    Procedure: COLONOSCOPY;  Surgeon: Mike Ko MD;  Location: [facility];  Service: Endoscopy;  Laterality: N/A;    CORONARY ARTERY BYPASS GRAFT (CABG) N/A 11/28/2023    Procedure: CORONARY ARTERY BYPASS GRAFT x 3;  Surgeon: Katie Agosto MD;   Location: [facility];  Service: Cardiothoracic;  Laterality: N/A;    ENDOSCOPIC HARVEST OF VEIN Left 2023    Procedure: HARVEST-VEIN-ENDOVASCULAR;  Surgeon: Katie Agosto MD;  Location: [facility];  Service: Cardiothoracic;  Laterality: Left;    KNEE ARTHROSCOPY Right     ROTATOR CUFF REPAIR Left      Family History   Problem Relation Age of Onset    COPD Father     Heart disease Father     Hypertension Father     Colon cancer Neg Hx      Social History     Tobacco Use    Smoking status: Former     Current packs/day: 0.00     Average packs/day: 1 pack/day for 5.0 years (5.0 ttl pk-yrs)     Types: Cigarettes     Start date:      Quit date:      Years since quittin.1    Smokeless tobacco: Never   Substance Use Topics    Alcohol use: Not Currently     Comment: Quit     Drug use: Never     Review of Systems   Constitutional:  Negative for appetite change, chills, fever and unexpected weight change.   HENT:  Negative for hearing loss, rhinorrhea and sore throat.    Eyes:  Negative for visual disturbance.   Respiratory:  Negative for cough, shortness of breath and wheezing.    Cardiovascular:  Negative for chest pain, palpitations and leg swelling.   Gastrointestinal:  Negative for abdominal pain.   Endocrine: Negative for cold intolerance, heat intolerance and polydipsia.   Genitourinary:  Negative for dysuria, frequency and hematuria.   Musculoskeletal:  Negative for arthralgias, joint swelling, myalgias and neck stiffness.   Skin:  Negative for color change and rash.   Neurological:  Negative for seizures and syncope.   Hematological:  Negative for adenopathy. Does not bruise/bleed easily.   Psychiatric/Behavioral:  Negative for confusion and dysphoric mood. The patient is not nervous/anxious.            Objective:        Physical Exam  General: Well-developed, well-nourished, NAD  Heart: RR&R, no murmurs   Lungs:  Clear to auscultation bilaterally   Abdomen:  Benign  Sternotomy Incision:  well-healed,  bone is solid  Extremities: No LE edema. EVH sites well-healed        Assessment:     1. Hx of CABG            Plan:       Continue current medications- ASA, atorvastatin, and metoprolol tartrate   Return to baseline activity as tolerated- return to work note provided today  Patient doing well post-op- will discharge from our clinic today

## 2024-02-08 ENCOUNTER — OFFICE VISIT (OUTPATIENT)
Dept: VASCULAR SURGERY | Facility: CLINIC | Age: 54
End: 2024-02-08
Payer: OTHER GOVERNMENT

## 2024-02-08 VITALS
DIASTOLIC BLOOD PRESSURE: 83 MMHG | BODY MASS INDEX: 27.31 KG/M2 | WEIGHT: 184.94 LBS | HEART RATE: 64 BPM | SYSTOLIC BLOOD PRESSURE: 124 MMHG

## 2024-02-08 DIAGNOSIS — Z95.1 HX OF CABG: Primary | ICD-10-CM

## 2024-02-08 PROCEDURE — 99213 OFFICE O/P EST LOW 20 MIN: CPT | Mod: PBBFAC,PO

## 2024-02-08 PROCEDURE — 99999 PR PBB SHADOW E&M-EST. PATIENT-LVL III: CPT | Mod: PBBFAC,,,

## 2024-02-08 PROCEDURE — 99024 POSTOP FOLLOW-UP VISIT: CPT | Mod: ,,,

## 2024-02-21 ENCOUNTER — TELEPHONE (OUTPATIENT)
Dept: VASCULAR SURGERY | Facility: CLINIC | Age: 54
End: 2024-02-21
Payer: OTHER GOVERNMENT

## 2024-02-21 NOTE — TELEPHONE ENCOUNTER
----- Message from Octavio Starr sent at 2/21/2024  9:23 AM CST -----  Type: Needs Medical Advice    Who Called:  Yessi flores from [employer]  Best Call Back Number: [phone]    Additional Information: Yessi flores from [employer] calling to follow up in status for request of medical records. It was faxed to office 1/31/24.  Please call back to advise, Thanks!

## 2024-02-27 ENCOUNTER — TELEPHONE (OUTPATIENT)
Dept: VASCULAR SURGERY | Facility: CLINIC | Age: 54
End: 2024-02-27
Payer: OTHER GOVERNMENT

## 2024-02-27 NOTE — TELEPHONE ENCOUNTER
Spoke w pt spouse and fax # given. Pt verbalized understanding.      ----- Message from Sandy Manzo sent at 2/27/2024  1:09 PM CST -----  Contact: Pt's wife  Type: Needs Medical Advice    Who Called:  Patient's wife  What is this regarding?:  She is sending a paper to be faxed that his insurance wants filled out.  Best Call Back Number:  [phone]  Additional Information:  Please call the patient's wife back at the phone number listed above to advise. Thank you!